# Patient Record
Sex: FEMALE | Race: WHITE
[De-identification: names, ages, dates, MRNs, and addresses within clinical notes are randomized per-mention and may not be internally consistent; named-entity substitution may affect disease eponyms.]

---

## 2018-10-07 ENCOUNTER — HOSPITAL ENCOUNTER (EMERGENCY)
Dept: HOSPITAL 80 - FED | Age: 18
Discharge: LEFT BEFORE DISCHARGE | End: 2018-10-07
Payer: COMMERCIAL

## 2018-10-07 VITALS — SYSTOLIC BLOOD PRESSURE: 103 MMHG | DIASTOLIC BLOOD PRESSURE: 70 MMHG

## 2018-10-07 DIAGNOSIS — Z53.21: Primary | ICD-10-CM

## 2018-10-07 NOTE — EDPHY
H & P


Stated Complaint: left pinky injury


Time Seen by Provider: 10/07/18 20:06





- Personal History


LMP (Females 10-55): Over 28 Days Ago


Current Tetanus Diphtheria and Acellular Pertussis (TDAP): No





- Medical/Surgical History


Hx Asthma: No


Hx Chronic Respiratory Disease: No


Hx Diabetes: No


Hx Cardiac Disease: No


Hx Renal Disease: No


Hx Cirrhosis: No


Hx Alcoholism: No


Hx HIV/AIDS: No


Hx Splenectomy or Spleen Trauma: No


Other PMH: none





- Social History


Smoking Status: Never smoked


Constitutional: 





 Initial Vital Signs











Temperature (C)  36.6 C   10/07/18 19:47


 


Heart Rate  96   10/07/18 19:47


 


Respiratory Rate  16   10/07/18 19:47


 


Blood Pressure  103/70   10/07/18 19:47


 


O2 Sat (%)  96   10/07/18 19:47











Allergies/Adverse Reactions: 


 





No Known Allergies Allergy (Unverified 10/07/18 19:46)


 








Home Medications: 














 Medication  Instructions  Recorded


 


NK [No Known Home Meds]  10/07/18














Medical Decision Making


ED Course/Re-evaluation: 





CHIEF COMPLAINT:  





HISTORY OF PRESENT ILLNESS:  must have 4 elements:  Location, Quality, Severity

, Duration, Timing, Context, Modifying Factors, Associated Signs and Symptoms





REVIEW OF SYSTEMS:  





A comprehensive 10 system review of systems is otherwise negative aside from 

elements mentioned in the history of present illness and medical decision 

making.





PHYSICAL EXAM:  





HR, BP, O2 Sat, RR.  Temp noted


General Appearance:  Alert, well hydrated, appropriate, and non-toxic appearing.


Head:  Atraumatic without scalp tenderness or obvious injury


Eyes:  Pupils equal, round, reactive to light and accommodation, EOMI, no trauma

, no injection.


Ears:  Clear bilaterally, no perforation, normal landmarks


Nose:  Atraumatic, no rhinorrhea, clear.


Throat:  There is no erythema or exudates, no lesions, normal tonsils, mucus 

membranes moist.


Neck:  Supple, 2+ carotid upstroke, nontender, no lymphadenopathy.


Respiratory:  No retractions, no distress, no wheezes, and no accessory muscle 

use.  Lungs are clear to auscultation bilaterally.


Cardiovascular:  Regular rate and rhythm, no murmurs, rubs, or gallops. 

Bilateral carotid, radial, dorsalis pedis, and posterior tibial pulses intact. 

Good capillary refill all extremities.


Gastrointestinal:  Abdomen is soft, nontender, non-distended, no masses, no 

rebound, no guarding, no peritoneal signs.


Musculoskeletal:  Normal active ROM of all extremities, atraumatic.


Neurological:  Alert, appropriate, and interactive.  The patient has normal 

DTRs and non-focal cranial nerves, motor, sensory, and cerebellar exam.


Skin:  No rashes, good turgor, no nodules on palpation.





Past medical history:


Past surgical history:


Family history:


Social history:





DIAGNOSTICS/PROCEDURES/CRITICAL CARE TIME:  








DIFFERENTIAL DIAGNOSIS:   





MEDICAL DECISION MAKING:  





Departure





- Departure


Disposition: Left Without Being Seen


Referrals: 


NONE *PRIMARY CARE P,. [Primary Care Provider] - As per Instructions